# Patient Record
Sex: MALE | Race: WHITE | NOT HISPANIC OR LATINO
[De-identification: names, ages, dates, MRNs, and addresses within clinical notes are randomized per-mention and may not be internally consistent; named-entity substitution may affect disease eponyms.]

---

## 2019-07-21 PROBLEM — Z00.00 ENCOUNTER FOR PREVENTIVE HEALTH EXAMINATION: Status: ACTIVE | Noted: 2019-07-21

## 2019-08-19 ENCOUNTER — APPOINTMENT (OUTPATIENT)
Dept: DERMATOLOGY | Facility: CLINIC | Age: 54
End: 2019-08-19
Payer: MEDICARE

## 2019-08-19 PROCEDURE — 99202 OFFICE O/P NEW SF 15 MIN: CPT

## 2023-04-04 ENCOUNTER — NON-APPOINTMENT (OUTPATIENT)
Age: 58
End: 2023-04-04

## 2023-05-04 ENCOUNTER — APPOINTMENT (OUTPATIENT)
Dept: ORTHOPEDIC SURGERY | Facility: CLINIC | Age: 58
End: 2023-05-04
Payer: MEDICARE

## 2023-05-04 DIAGNOSIS — Z78.9 OTHER SPECIFIED HEALTH STATUS: ICD-10-CM

## 2023-05-04 DIAGNOSIS — Z86.79 PERSONAL HISTORY OF OTHER DISEASES OF THE CIRCULATORY SYSTEM: ICD-10-CM

## 2023-05-04 DIAGNOSIS — G90.522 COMPLEX REGIONAL PAIN SYNDROME I OF LEFT LOWER LIMB: ICD-10-CM

## 2023-05-04 DIAGNOSIS — S93.602A UNSPECIFIED SPRAIN OF LEFT FOOT, INITIAL ENCOUNTER: ICD-10-CM

## 2023-05-04 PROCEDURE — 99203 OFFICE O/P NEW LOW 30 MIN: CPT

## 2023-05-04 NOTE — PHYSICAL EXAM
[de-identified] : Examination of the left foot and ankle is as follows:\par INSPECTION: mild diffuse foot swelling, no abrasion, no laceration, no erythema, no ecchymosis, no gross deformity\par PALPATION: tenderness to palpation throughout entire foot, pain is out of proportion to exam \par ROM: dorsiflexion 20 degrees, plantar flexion 40 degrees, inversion 30 degrees, eversion 20 degrees.\par STRENGTH: dorsiflexion 5/5. plantar flexion 5/5, inversion 5/5, eversion 5/5, EHL 4/5, FHL 4/5\par VASCULAR: dorsalis pedis pulse: 2+, posterior tibialis pulse: 2+\par NEURO: pain out of proportion to exam, pain in nondermatomal distribution\par GAIT: mildly antalgic, but patient ambulates without assistive device\par \par X-rays of the left foot is as follows: outside x-rays reviewed from [facility]\par Foot 3 view: There are no fractures, subluxations or dislocations. No significant abnormalities are seen.

## 2023-05-04 NOTE — HISTORY OF PRESENT ILLNESS
[Sudden] : sudden [3] : 3 [0] : 0 [Dull/Aching] : dull/aching [Throbbing] : throbbing [Intermittent] : intermittent [Household chores] : household chores [Leisure] : leisure [Social interactions] : social interactions [Rest] : rest [de-identified] : Patient is here for his left foot. Patient states he fell out of bed 4/2023. Patient states he was taken to [facility] and had x-ray. Patient states he has pain on the anterior aspect of his foot that is aching. Patient is taking Tylenol for his pain. Patient is pre-diabetic. Patient's DSP Asha reports that patient has not had fevers/chills. Patient has been elevating and icing foot. [] : Post Surgical Visit: no [FreeTextEntry1] : Left foot  [FreeTextEntry3] : 4/2023 [FreeTextEntry5] : Patient fell out of bed  [de-identified] : Movement

## 2023-05-04 NOTE — ASSESSMENT
[FreeTextEntry1] : 56 yo male presenting with left foot sprain and CRPS. X-rays reviewed from [facility] negative. Patient's pain is out of proportion to exam and has been worsening in onset over the past month. \par -Rx MRI left foot w/o contrast to r/o stress fracture/ligamentous injury\par -LLE WBAT\par -Activities as tolerated, avoid strenuous/impact related activities\par -Rest, ice, compression, elevation, NSAIDs PRN for pain. \par -All questions answered\par -F/u after MRI\par \par Entered by Joseph Piper PA-C acting as scribe.\par Dr. Ortega Attestation\par The documentation recorded by the scribe, in my presence, accurately reflects the service I, Dr. Ortega, personally performed, and the decisions made by me with my edits as appropriate.\par \par

## 2023-05-07 ENCOUNTER — FORM ENCOUNTER (OUTPATIENT)
Age: 58
End: 2023-05-07

## 2023-05-19 ENCOUNTER — RESULT REVIEW (OUTPATIENT)
Age: 58
End: 2023-05-19

## 2023-05-24 ENCOUNTER — APPOINTMENT (OUTPATIENT)
Dept: ORTHOPEDIC SURGERY | Facility: CLINIC | Age: 58
End: 2023-05-24
Payer: MEDICARE

## 2023-05-24 DIAGNOSIS — M65.9 SYNOVITIS AND TENOSYNOVITIS, UNSPECIFIED: ICD-10-CM

## 2023-05-24 PROCEDURE — 99213 OFFICE O/P EST LOW 20 MIN: CPT

## 2023-05-24 NOTE — HISTORY OF PRESENT ILLNESS
[Sudden] : sudden [3] : 3 [0] : 0 [Dull/Aching] : dull/aching [Throbbing] : throbbing [Intermittent] : intermittent [Household chores] : household chores [Leisure] : leisure [Social interactions] : social interactions [Rest] : rest [de-identified] : Patient is here for his left foot MRI result. Patient had MRI done at [facility] on 5/19/2023. Patient accompanied by RUTHANN Barrera.\par \par IMPRESSION:\par \par \par Complete tear of the plantar plate to the second MP joint and dorsal subluxation\par of the second proximal phalangeal base. There is intense bone marrow edema\par within both the metatarsal head and proximal phalangeal base and there is\par suggestion of a small erosion along the dorsal aspect of the proximal phalangeal\par base. There is a prominent joint synovitis and surrounding soft tissue edema.\par \par Given the degree of joint synovitis along with small erosions findings are\par worrisome for of a superimposed infectious or inflammatory arthropathy.\par \par Moderate bone marrow edema at the third metatarsal head without a discrete\par fracture or articular collapse. There is mild scarring of the plantar plate to\par the third MP joint without a discrete tear.\par \par Mild midfoot arthrosis. [] : Post Surgical Visit: no [FreeTextEntry1] : Left foot  [FreeTextEntry3] : 4/2023 [FreeTextEntry5] : Patient fell out of bed  [de-identified] : Movement

## 2023-05-24 NOTE — ASSESSMENT
[FreeTextEntry1] : 56 yo male presenting for MRI viewing of left foot revealing complete tear of plantar plate to second MP joint with dorsal subluxation of 2nd proximal phalangeal base, bone marrow edema within metatarsal head and proximal phalangeal base, small erosion of dorsal aspect of proximal phalangeal base, prominent joint synovitis and surrounding soft tissue edema, moderate bone marrow edema at the third metatarsal head without a discrete\par fracture or articular collapse. There is mild scarring of the plantar plate to the third MP joint without a discrete tear. \par Since last visit patient's pain has improved, exam benign.\par -Recommend that patient seek evaluation from rheumatologist given degree of synovitis\par -patient may resume activities and go to program\par -Activities as tolerated\par -All questions answered\par -F/u PRN\par \par Entered by Joseph Piper PA-C acting as scribe.\par Dr. Ortega Attestation\par The documentation recorded by the scribe, in my presence, accurately reflects the service I, Dr. Ortega, personally performed, and the decisions made by me with my edits as appropriate.\par \par

## 2023-05-24 NOTE — PHYSICAL EXAM
[de-identified] : Examination of the left foot and ankle is as follows:\par INSPECTION: no swelling, no abrasion, no laceration, no erythema, no ecchymosis, no gross deformity\par PALPATION: no tenderness to palpation over 2nd or 3rd MTP joints\par ROM: dorsiflexion 20 degrees, plantar flexion 40 degrees, inversion 30 degrees, eversion 20 degrees.\par STRENGTH: dorsiflexion 5/5. plantar flexion 5/5, inversion 5/5, eversion 5/5, EHL 5/5, FHL 5/5\par VASCULAR: dorsalis pedis pulse: 2+, posterior tibialis pulse: 2+\par NEURO: sensation intact L2-S1.\par GAIT: non- antalgic, but patient ambulates without assistive device

## 2023-05-24 NOTE — DATA REVIEWED
[MRI] : MRI [Left] : left [Foot] : foot [I independently reviewed and interpreted images and report] : I independently reviewed and interpreted images and report [FreeTextEntry1] : Miguel; IMPRESSION:\par \par \par Complete tear of the plantar plate to the second MP joint and dorsal subluxation\par of the second proximal phalangeal base. There is intense bone marrow edema\par within both the metatarsal head and proximal phalangeal base and there is\par suggestion of a small erosion along the dorsal aspect of the proximal phalangeal\par base. There is a prominent joint synovitis and surrounding soft tissue edema.\par \par Given the degree of joint synovitis along with small erosions findings are\par worrisome for of a superimposed infectious or inflammatory arthropathy.\par \par Moderate bone marrow edema at the third metatarsal head without a discrete\par fracture or articular collapse. There is mild scarring of the plantar plate to\par the third MP joint without a discrete tear.\par \par Mild midfoot arthrosis.

## 2025-03-12 ENCOUNTER — NON-APPOINTMENT (OUTPATIENT)
Age: 60
End: 2025-03-12

## 2025-06-27 ENCOUNTER — APPOINTMENT (OUTPATIENT)
Dept: PULMONOLOGY | Facility: CLINIC | Age: 60
End: 2025-06-27
Payer: MEDICARE

## 2025-06-27 PROCEDURE — 94729 DIFFUSING CAPACITY: CPT | Mod: TC

## 2025-06-27 PROCEDURE — 94727 GAS DIL/WSHOT DETER LNG VOL: CPT | Mod: TC

## 2025-06-27 PROCEDURE — 94060 EVALUATION OF WHEEZING: CPT | Mod: TC

## 2025-06-27 RX ORDER — ALBUTEROL SULFATE 2.5 MG/3ML
(2.5 MG/3ML) SOLUTION RESPIRATORY (INHALATION)
Qty: 0 | Refills: 0 | Status: COMPLETED | OUTPATIENT
Start: 2025-06-27

## 2025-06-27 RX ADMIN — Medication 0 0.083%: at 00:00

## 2025-07-03 ENCOUNTER — NON-APPOINTMENT (OUTPATIENT)
Age: 60
End: 2025-07-03